# Patient Record
Sex: FEMALE | Race: WHITE | NOT HISPANIC OR LATINO | URBAN - METROPOLITAN AREA
[De-identification: names, ages, dates, MRNs, and addresses within clinical notes are randomized per-mention and may not be internally consistent; named-entity substitution may affect disease eponyms.]

---

## 2023-09-09 ENCOUNTER — WEB ENCOUNTER (OUTPATIENT)
Dept: URBAN - METROPOLITAN AREA CLINIC 7 | Facility: CLINIC | Age: 63
End: 2023-09-09

## 2023-09-13 PROBLEM — 863927004: Status: ACTIVE | Noted: 2023-09-13

## 2023-09-14 ENCOUNTER — LAB OUTSIDE AN ENCOUNTER (OUTPATIENT)
Dept: URBAN - METROPOLITAN AREA CLINIC 7 | Facility: CLINIC | Age: 63
End: 2023-09-14

## 2023-09-14 ENCOUNTER — OFFICE VISIT (OUTPATIENT)
Dept: URBAN - METROPOLITAN AREA CLINIC 7 | Facility: CLINIC | Age: 63
End: 2023-09-14
Payer: COMMERCIAL

## 2023-09-14 ENCOUNTER — WEB ENCOUNTER (OUTPATIENT)
Dept: URBAN - METROPOLITAN AREA CLINIC 7 | Facility: CLINIC | Age: 63
End: 2023-09-14

## 2023-09-14 ENCOUNTER — DASHBOARD ENCOUNTERS (OUTPATIENT)
Age: 63
End: 2023-09-14

## 2023-09-14 VITALS
DIASTOLIC BLOOD PRESSURE: 78 MMHG | SYSTOLIC BLOOD PRESSURE: 118 MMHG | BODY MASS INDEX: 23.73 KG/M2 | HEIGHT: 64 IN | HEART RATE: 80 BPM | WEIGHT: 139 LBS | TEMPERATURE: 97.8 F

## 2023-09-14 DIAGNOSIS — R79.89 ELEVATED LFTS: ICD-10-CM

## 2023-09-14 DIAGNOSIS — K76.0 HEPATIC STEATOSIS: ICD-10-CM

## 2023-09-14 DIAGNOSIS — K58.1 IRRITABLE BOWEL SYNDROME WITH CONSTIPATION: ICD-10-CM

## 2023-09-14 DIAGNOSIS — D69.6 THROMBOCYTOPENIA: ICD-10-CM

## 2023-09-14 PROBLEM — 197321007: Status: ACTIVE | Noted: 2023-09-14

## 2023-09-14 PROBLEM — 302215000: Status: ACTIVE | Noted: 2023-09-14

## 2023-09-14 PROCEDURE — 99204 OFFICE O/P NEW MOD 45 MIN: CPT | Performed by: INTERNAL MEDICINE

## 2023-09-14 RX ORDER — SEMAGLUTIDE 1.34 MG/ML
AS DIRECTED INJECTION, SOLUTION SUBCUTANEOUS
Status: ACTIVE | COMMUNITY

## 2023-09-14 NOTE — HPI-TODAY'S VISIT:
Patient is new to the practice and is being evaluated for elevated LFTs. Hgb 13.8, plts 99 on labs from early August 2023. AST/ALT 31/41, alk phos 47, bili 0.5, albumin 4.4. Cr normal. She has had lower platelets for years, 108 in 2005. Seems this is more of a chronic issue. MCV 95. No bleeding symptoms. She is on ozempic and is doing this weekly, was 155 lbs, 139 now. Struggling with hemorrhoids, using suppositories. She is historically constipated. Takes MOM occasionally. Not really symptomatic from the constipation. Last colonoscopy in 2021 was clear, no polyps before. Drinks 1-2 glasses wine per day.

## 2024-10-22 ENCOUNTER — WEB ENCOUNTER (OUTPATIENT)
Dept: URBAN - METROPOLITAN AREA CLINIC 7 | Facility: CLINIC | Age: 64
End: 2024-10-22

## 2024-10-22 ENCOUNTER — TELEPHONE ENCOUNTER (OUTPATIENT)
Dept: URBAN - METROPOLITAN AREA CLINIC 7 | Facility: CLINIC | Age: 64
End: 2024-10-22